# Patient Record
(demographics unavailable — no encounter records)

---

## 2025-06-19 NOTE — IMAGING
[de-identified] : RIGHT Lower Extremity examined.   Inspection no gross deformity, skin intact, no erythema Incision site healed well. No signs of infection.  Palpation; patient with - tenderness to palpation in the groin, - tenderness over the greater troches ROM: 115 flexion, 5 IR, 45 ER, 45 Abduction, 15 Adduction ++ Stinchfield + FADIR, - BARBARA - SLR 5/5 motor to lower extremity Sensation intact to light touch throughout all lower extremity dermatomes No numbness in lateral femoral cutaneous nerve 2+ distal pulses 2cm long RLE compared to LLE. Leg length discrepancy assessed at the medial mal   06/19: AP Pelvis, AP Hip and Frog lateral of RIGHT hip. Joint line narrowing, subchondral sclerosis and osteophyte formation inferior and superior margin consistent with moderate to severe DJD. No fractures or dislocation noted. Hardware from total hip arthroplasty of left hip well fixed and well aligned.

## 2025-06-19 NOTE — ASSESSMENT
[FreeTextEntry1] : Assessment:   MAGEN STEIN is a 58-year y/o M with history and physical exam consistent with moderate to severe right hip osteoarthritis. Patient is not satisfied with his leg length discrepancy following his left total hip arthroplasty in 2022.    Plan:   - Lengthy discussion regarding options was had with the patient. Nonsurgical options including but not limited to cortisone, Prescription anti-inflammatory medications (both steroidal and non-steroidal), activity modification, non-impact exercise, maintaining a healthy BMI, and icing were reviewed.   - Discussed Home exercise Program as well as Rest, Ice and elevation.  - Recommend external shoe lit for right foot to even out leg lengths.    - At this time after discussion of the options, the patient would benefit from organized Physical Therapy to increase overall functionality.   - The patient was provided with an Rx in office today and was instructed to attend PT for 6-8 weeks in order to increase strength and ROM. Patient agreed with this plan and will begin PT as soon as they can.   - After discussion of options, Patient was provided with a prescription for Meloxicam 15mg Daily. Instructed patient to d/c other NSAIDs while on the meloxicam.  - Time was taken to discuss the importance of proper prescription drug management. They were instructed to take this daily with food for two weeks and then intermittently as needed. The patient was also warned of potential risks/side effects of the medication. These potential risks include bruising, gastrointestinal bleed, gastrointestinal burning, allergic reaction and reduced blood clotting. The patient expressed verbal understanding. I recommend that the patient follow-up with their medical physician to discuss any significant specific potential issues with long term use such as interactions with current medications or with exacerbation of underlying medical morbidities.  - Discussed potential for total hip arthroplasty in the future. Decision was made today to go forward with nonop treatment.   - At this time the patient was advised on proper weight management. Discussion was had at length with the patient about the importance of maintaining a healthy BMI. Discussion was had in regard to how they could achieve this, and proper methods for pursuing this goal.  It was discussed with the patient that at this time, if surgical intervention was proceeded with, their current BMI over 40 would contraindicate them for the procedure as it would heighten risks of complications.   - Follow up in 8 weeks for re-evaluation - will consider CSI injection depending on patient response vs discussion regarding ERIN.   - The Patient was asked if they had any remaining questions about today's visit and the diagnosis, and all questions were answered. Patient understands the plan going forward.

## 2025-06-19 NOTE — HISTORY OF PRESENT ILLNESS
[de-identified] : 06/19/2025 HPI:**** MAGEN STEIN is a 58 year y/o M who presents for right hip pain for years into the groin. Patient feels that the right leg can feel it can give out. Patient notes pain is worse with WB activities such as ambulation. Patient notes pain with activities such as cutting the lawn. Patient has attempted conservative measures including Advil and Tylenol, which only takes the edge off. Patient was also prescribed Fentanyl patches with Dr. Schultz with no relief.    History of LEFT hip replaced 2022 with [facility].   Patient is not on a blood thinner. Patient exercising to lose weight and is on Wegovy for weight loss. Patient denies recent smoking hx.     Patient presents today, 58 year M, for evaluation of their RT hip Date of Injury/Onset: years Mechanism of injury: NKI This is not a Work-Related Injury being treated under Worker's Compensation. This is not an athletic injury occurring associated with an interscholastic or organized sports team.   Pain: At Rest: 3-6 /10 With Activity: 3-6 /10 Quality of symptoms: right hip pain into groin, feels like right leg will give out. Patient has difficulty with his daily activities.   Affecting Sleep: Yes Stiffness upon waking, lasting greater than 30mins: Yes, pain Difficulty with stairs: Yes Difficulty getting in and out of car: Yes Difficulty applying shoe: Yes Sit to stand stiffness: Yes Affects walking short/long distances? Yes Home/Work/Recreation affected? Yes   Improves with:  nothing Worse with:  activity Have you been treated for this in the past? PCP, Dr. Vega  Have you had surgery for this in the past? none. history of LEFT hip replaced 2022 with [facility]   OTC Medicines: Advil, Tylenol with no relief RX medicines:  fentanyl patches prescribed by Dr. Schultz, with no relief Heat, Ice, Elevation: heat, ice   CSI or Gel Injections: No Physical Therapy/ HEP: last year   Previous Treatment/Imaging/Studies Since Last Visit: [facility] done at [facility]